# Patient Record
Sex: MALE | Race: WHITE | NOT HISPANIC OR LATINO | Employment: UNEMPLOYED | URBAN - METROPOLITAN AREA
[De-identification: names, ages, dates, MRNs, and addresses within clinical notes are randomized per-mention and may not be internally consistent; named-entity substitution may affect disease eponyms.]

---

## 2023-05-15 ENCOUNTER — HOSPITAL ENCOUNTER (EMERGENCY)
Facility: HOSPITAL | Age: 23
Discharge: HOME | End: 2023-05-15

## 2023-05-15 VITALS
HEART RATE: 138 BPM | WEIGHT: 154 LBS | DIASTOLIC BLOOD PRESSURE: 96 MMHG | OXYGEN SATURATION: 96 % | RESPIRATION RATE: 20 BRPM | HEIGHT: 70 IN | TEMPERATURE: 99 F | SYSTOLIC BLOOD PRESSURE: 133 MMHG | BODY MASS INDEX: 22.05 KG/M2

## 2023-05-15 DIAGNOSIS — K02.9 DENTAL CARIES: Primary | ICD-10-CM

## 2023-05-15 PROCEDURE — 99284 EMERGENCY DEPT VISIT MOD MDM: CPT

## 2023-05-15 RX ORDER — IBUPROFEN 800 MG/1
800 TABLET ORAL EVERY 6 HOURS PRN
Qty: 20 TABLET | Refills: 0 | Status: SHIPPED | OUTPATIENT
Start: 2023-05-15

## 2023-05-15 RX ORDER — PENICILLIN V POTASSIUM 500 MG/1
500 TABLET, FILM COATED ORAL 4 TIMES DAILY
Qty: 40 TABLET | Refills: 0 | Status: SHIPPED | OUTPATIENT
Start: 2023-05-15 | End: 2023-05-25

## 2023-05-16 NOTE — DISCHARGE INSTRUCTIONS
Follow up at [facility] in [place] or the dentist of your choice.  Meds as prescribed  Return for persistent or worsening symptoms

## 2023-05-16 NOTE — ED PROVIDER NOTES
Encounter Date: 5/15/2023       History     Chief Complaint   Patient presents with    Dental Pain     Left mandibular dental pain x 1 week. Took old RX for amoxicillin. Went away but 2 days ago, it came back      22-year-old  male ambulatory to the emergency department for reports of pain, tenderness, swelling of tooth number 22 onset times 10 days ago.  He states he took some amoxicillin that his girlfriend had left over and the pain went away for about 2 days but the amoxicillin ran out and now the treat the pain has returned.  He reports the pain is relieved somewhat by Tylenol.  The pain is exacerbated by chewing.    Review of patient's allergies indicates:  No Known Allergies  History reviewed. No pertinent past medical history.  History reviewed. No pertinent surgical history.  History reviewed. No pertinent family history.  Social History     Tobacco Use    Smoking status: Every Day     Types: Cigarettes    Smokeless tobacco: Never   Substance Use Topics    Alcohol use: Not Currently    Drug use: Yes     Types: Marijuana     Review of Systems   Constitutional: Negative.    HENT:  Positive for dental problem.         Tenderness, swelling, erythema to tooth number 22   Eyes: Negative.    Respiratory: Negative.     Cardiovascular: Negative.    Gastrointestinal: Negative.    Endocrine: Negative.    Genitourinary: Negative.    Musculoskeletal: Negative.    Skin: Negative.    Allergic/Immunologic: Negative.    Neurological: Negative.    Hematological: Negative.    Psychiatric/Behavioral: Negative.       Physical Exam     Initial Vitals [05/15/23 1841]   BP Pulse Resp Temp SpO2   (!) 133/96 (!) 138 20 98.6 °F (37 °C) 96 %      MAP       --         Physical Exam    Nursing note and vitals reviewed.  Constitutional: He appears well-developed and well-nourished.   HENT:   Head: Normocephalic and atraumatic.   Right Ear: External ear normal.   Left Ear: External ear normal.   Nose: Nose normal.   Mouth/Throat:        Erythema to gingiva opposite tooth number 22   Eyes: Conjunctivae and EOM are normal. Pupils are equal, round, and reactive to light.   Neck: Neck supple.   Normal range of motion.  Cardiovascular:  Normal rate, regular rhythm, normal heart sounds and intact distal pulses.           Pulmonary/Chest: Breath sounds normal.   Abdominal: Abdomen is soft.   Musculoskeletal:         General: Normal range of motion.      Cervical back: Normal range of motion and neck supple.     Neurological: He is alert and oriented to person, place, and time. He has normal strength. GCS score is 15. GCS eye subscore is 4. GCS verbal subscore is 5. GCS motor subscore is 6.   Skin: Skin is warm and dry.   Psychiatric: He has a normal mood and affect. His behavior is normal. Thought content normal.       ED Course   Procedures  Labs Reviewed - No data to display       Imaging Results    None          Medications - No data to display  Medical Decision Making:   Initial Assessment:   Reports he is had tenderness to tooth number 22 intermittently for greater than 3 months, reports she experienced swelling approximately 10 days ago which was relieved by taking amoxicillin that his girlfriend had common bile he ran out the amoxicillin and the pain returned 2 days ago he has been taking Tylenol for relief of the pain.  There is redness to the gingiva to the anterior of the tooth number 22, there is tenderness to palpation, he reports tenderness to cold and heat and chewing, lungs are clear to auscultation, there is lymphadenopathy submandibularly on the left side of his face with swelling.  There is no fluctuance, cranial nerves 2-12 grossly intact, bowel sounds positive normal quadrants, normoactive, normally pitched.  Differential Diagnosis:   Tooth fracture, dental abscess, dental caries, dental infection.  ED Management:  Given ibuprofen 800 mg in the ER, discharged with prescription for ibuprofen 800 mg number 21 and Pen-VK q.4 hours  x7 days.                        Clinical Impression:                 Brent Kelly NP  05/15/23 1931